# Patient Record
Sex: FEMALE | Race: OTHER | HISPANIC OR LATINO
[De-identification: names, ages, dates, MRNs, and addresses within clinical notes are randomized per-mention and may not be internally consistent; named-entity substitution may affect disease eponyms.]

---

## 2017-04-24 ENCOUNTER — APPOINTMENT (OUTPATIENT)
Dept: ELECTROPHYSIOLOGY | Facility: CLINIC | Age: 82
End: 2017-04-24

## 2017-08-17 ENCOUNTER — APPOINTMENT (OUTPATIENT)
Dept: ELECTROPHYSIOLOGY | Facility: CLINIC | Age: 82
End: 2017-08-17
Payer: MEDICARE

## 2017-08-17 PROCEDURE — 93280 PM DEVICE PROGR EVAL DUAL: CPT

## 2017-11-20 ENCOUNTER — APPOINTMENT (OUTPATIENT)
Dept: ELECTROPHYSIOLOGY | Facility: CLINIC | Age: 82
End: 2017-11-20
Payer: MEDICARE

## 2017-11-20 PROCEDURE — 93296 REM INTERROG EVL PM/IDS: CPT

## 2017-11-20 PROCEDURE — 93294 REM INTERROG EVL PM/LDLS PM: CPT

## 2018-03-08 ENCOUNTER — APPOINTMENT (OUTPATIENT)
Dept: ELECTROPHYSIOLOGY | Facility: CLINIC | Age: 83
End: 2018-03-08

## 2018-03-16 ENCOUNTER — APPOINTMENT (OUTPATIENT)
Dept: ELECTROPHYSIOLOGY | Facility: CLINIC | Age: 83
End: 2018-03-16
Payer: MEDICARE

## 2018-03-16 PROCEDURE — 93280 PM DEVICE PROGR EVAL DUAL: CPT

## 2018-06-12 ENCOUNTER — APPOINTMENT (OUTPATIENT)
Dept: ELECTROPHYSIOLOGY | Facility: CLINIC | Age: 83
End: 2018-06-12
Payer: MEDICARE

## 2018-06-12 PROCEDURE — 93294 REM INTERROG EVL PM/LDLS PM: CPT

## 2018-06-12 PROCEDURE — 93296 REM INTERROG EVL PM/IDS: CPT

## 2018-08-08 ENCOUNTER — APPOINTMENT (OUTPATIENT)
Dept: VASCULAR SURGERY | Facility: CLINIC | Age: 83
End: 2018-08-08
Payer: MEDICARE

## 2018-08-08 VITALS
TEMPERATURE: 98 F | BODY MASS INDEX: 33.58 KG/M2 | HEIGHT: 58 IN | DIASTOLIC BLOOD PRESSURE: 78 MMHG | WEIGHT: 160 LBS | SYSTOLIC BLOOD PRESSURE: 147 MMHG | HEART RATE: 83 BPM

## 2018-08-08 PROCEDURE — 99204 OFFICE O/P NEW MOD 45 MIN: CPT

## 2018-08-08 PROCEDURE — 93880 EXTRACRANIAL BILAT STUDY: CPT

## 2018-09-13 ENCOUNTER — APPOINTMENT (OUTPATIENT)
Dept: ELECTROPHYSIOLOGY | Facility: CLINIC | Age: 83
End: 2018-09-13
Payer: MEDICARE

## 2018-09-13 PROCEDURE — 93280 PM DEVICE PROGR EVAL DUAL: CPT

## 2018-09-13 RX ORDER — AMLODIPINE BESYLATE VALSARTAN HYDROCHLOROTHIAZIDE 5; 12.5; 16 MG/1; MG/1; MG/1
5-160-12.5 TABLET, FILM COATED ORAL
Refills: 0 | Status: DISCONTINUED | COMMUNITY
End: 2018-09-13

## 2018-09-13 RX ORDER — AMLODIPINE VALSARTAN AND HYDROCHLOROTHIAZIDE 5; 160; 12.5 MG/1; MG/1; MG/1
5-160-12.5 TABLET, FILM COATED ORAL DAILY
Refills: 0 | Status: DISCONTINUED | COMMUNITY
End: 2018-09-13

## 2018-12-17 ENCOUNTER — APPOINTMENT (OUTPATIENT)
Dept: ELECTROPHYSIOLOGY | Facility: CLINIC | Age: 83
End: 2018-12-17

## 2019-03-21 ENCOUNTER — APPOINTMENT (OUTPATIENT)
Dept: ELECTROPHYSIOLOGY | Facility: CLINIC | Age: 84
End: 2019-03-21
Payer: MEDICARE

## 2019-03-21 PROCEDURE — 93280 PM DEVICE PROGR EVAL DUAL: CPT

## 2019-03-21 RX ORDER — LOSARTAN POTASSIUM AND HYDROCHLOROTHIAZIDE 12.5; 1 MG/1; MG/1
100-12.5 TABLET ORAL
Refills: 0 | Status: DISCONTINUED | COMMUNITY
End: 2019-03-21

## 2019-06-17 ENCOUNTER — APPOINTMENT (OUTPATIENT)
Dept: PULMONOLOGY | Facility: CLINIC | Age: 84
End: 2019-06-17
Payer: MEDICARE

## 2019-06-17 VITALS
SYSTOLIC BLOOD PRESSURE: 142 MMHG | BODY MASS INDEX: 33.58 KG/M2 | HEART RATE: 64 BPM | HEIGHT: 58 IN | DIASTOLIC BLOOD PRESSURE: 75 MMHG | RESPIRATION RATE: 16 BRPM | OXYGEN SATURATION: 97 % | WEIGHT: 160 LBS | TEMPERATURE: 98 F

## 2019-06-17 PROCEDURE — 94727 GAS DIL/WSHOT DETER LNG VOL: CPT

## 2019-06-17 PROCEDURE — 99203 OFFICE O/P NEW LOW 30 MIN: CPT | Mod: 25

## 2019-06-17 PROCEDURE — 94060 EVALUATION OF WHEEZING: CPT

## 2019-06-17 PROCEDURE — 94729 DIFFUSING CAPACITY: CPT

## 2019-06-17 NOTE — PROCEDURE
[FreeTextEntry1] : PFT\par \par mild restriction\par \par mild moderate obstruction (has used Symbicort earlier today)\par \par diminished diffusion\par \par \par

## 2019-06-17 NOTE — REVIEW OF SYSTEMS
[Fever] : fever [Nasal Congestion] : nasal congestion [Hypertension] : ~T hypertension [Edema] : ~T edema was present [Heartburn] : heartburn [Reflux] : reflux [Recent Wt Loss (___ Lbs)] : no recent weight loss [Sinus Problems] : no sinus problems [Dysrhythmia] : no dysrhythmia [Hay Fever] : no hay fever [Vomiting] : no vomiting [FreeTextEntry9] : not currently [FreeTextEntry2] : no prior history of sinusiotis

## 2019-06-17 NOTE — PHYSICAL EXAM
[Elongated Uvula] : elongated uvula [Enlarged Base of the Tongue] : enlargement of the base of the tongue [Jugular Venous Distention Increased] : there was no jugular-venous distention [Heart Rate And Rhythm] : heart rate and rhythm were normal [Murmurs] : no murmurs present [Heart Sounds] : normal S1 and S2 [Arterial Pulses Normal] : the arterial pulses were normal [Respiration, Rhythm And Depth] : normal respiratory rhythm and effort [Exaggerated Use Of Accessory Muscles For Inspiration] : no accessory muscle use [Bowel Sounds] : normal bowel sounds [Kyphosis] : kyphosis [Abdomen Tenderness] : non-tender [] : no hepato-splenomegaly [Abdomen Soft] : soft [Nail Clubbing] : no clubbing of the fingernails [Motor Exam] : the motor exam was normal [Mood] : the mood was normal [Affect] : the affect was normal [Normal Oropharynx] : abnormal oropharynx [Low Lying Soft Palate] : no low lying soft palate [FreeTextEntry1] : coarse airway rhonchi and wheeze [FreeTextEntry2] : no edema

## 2019-06-17 NOTE — HISTORY OF PRESENT ILLNESS
[FreeTextEntry1] : 86 year old woman who presents due to chronic cough as well as dyspnea on exertion.  She states she has had it for 2-3 years and is worse recently. She is accompanied by her son and other .  She developed increased cough 2 weeks ago after a URI.  She states she did not require antibiotic. She states she still has yellow phlegm. She reports nasal congestion No chest pain.  She was started on Symbicort 1-2 weeks ago because of her symptoms.  \par \par She has been wheezing.  She has no prior history in the past.  She has had pneumonia in the past.\par \par \par PSH:\par \par CAB\par Gallbladder\par appendectomy\par Knees\par wrist\par \par ? hysterectomy\par \par PMH\par HTN\par \par montelukast\par \par Symbicort 80/4.5\par \par albuterol via nebulizer  3 times per day\par \par ASA\par \par Allergy\par \par NKDA\par no seasonal allergy.\par \par \par \par SH never smoked\par \par \par \par \par ROS back pain, leg fatigue\par arthritis symptoms

## 2019-06-17 NOTE — DISCUSSION/SUMMARY
[FreeTextEntry1] : asthmatic bronchitis\par \par reactive airways\par \par restriction due to body habitus or parenchyma lung condition\par \par diminished DLCO\par \par history of CAD\par \par PLAN\par \par CXR\par \par continue Symbicort 2 p BID, albuterol as needed, decrease nebulzier use\par \par prednisone taper 20 mg\par \par oral antibiotic to be started, doxycycline 100 mg once daily for 7 days\par \par expectorant Mucinex daily\par \par consider workup for LEONARD after current episode.\par \par [facility]

## 2019-07-01 ENCOUNTER — APPOINTMENT (OUTPATIENT)
Dept: PULMONOLOGY | Facility: CLINIC | Age: 84
End: 2019-07-01
Payer: MEDICARE

## 2019-07-01 VITALS
WEIGHT: 160 LBS | OXYGEN SATURATION: 95 % | HEART RATE: 64 BPM | SYSTOLIC BLOOD PRESSURE: 153 MMHG | TEMPERATURE: 98.5 F | DIASTOLIC BLOOD PRESSURE: 72 MMHG | RESPIRATION RATE: 16 BRPM | BODY MASS INDEX: 33.58 KG/M2 | HEIGHT: 58 IN

## 2019-07-01 DIAGNOSIS — I25.10 ATHEROSCLEROTIC HEART DISEASE OF NATIVE CORONARY ARTERY W/OUT ANGINA PECTORIS: ICD-10-CM

## 2019-07-01 PROCEDURE — 99214 OFFICE O/P EST MOD 30 MIN: CPT

## 2019-07-01 RX ORDER — ALBUTEROL SULFATE 90 UG/1
108 (90 BASE) AEROSOL, METERED RESPIRATORY (INHALATION)
Qty: 1 | Refills: 5 | Status: ACTIVE | COMMUNITY
Start: 2019-07-01 | End: 1900-01-01

## 2019-07-01 NOTE — DISCUSSION/SUMMARY
[FreeTextEntry1] : Bronchitis, improved\par \par CAD, stable\par \par PLAN\par \par She may use albuterol as needed\par \par Will monitor off Symbicort as she has no prior history and has done well off Symbicort\par \par I have ordered SERA to use as needed. \par \par CXR was not done.  Will reorder\par \par [facility]

## 2019-07-01 NOTE — PHYSICAL EXAM
[Elongated Uvula] : elongated uvula [Enlarged Base of the Tongue] : enlargement of the base of the tongue [Jugular Venous Distention Increased] : there was no jugular-venous distention [Heart Rate And Rhythm] : heart rate and rhythm were normal [Heart Sounds] : normal S1 and S2 [Murmurs] : no murmurs present [Arterial Pulses Normal] : the arterial pulses were normal [Respiration, Rhythm And Depth] : normal respiratory rhythm and effort [Exaggerated Use Of Accessory Muscles For Inspiration] : no accessory muscle use [Auscultation Breath Sounds / Voice Sounds] : lungs were clear to auscultation bilaterally [Kyphosis] : kyphosis [Bowel Sounds] : normal bowel sounds [Abdomen Soft] : soft [Abdomen Tenderness] : non-tender [] : no hepato-splenomegaly [Nail Clubbing] : no clubbing of the fingernails [Motor Exam] : the motor exam was normal [Affect] : the affect was normal [Mood] : the mood was normal [Normal Oropharynx] : abnormal oropharynx [Low Lying Soft Palate] : no low lying soft palate [FreeTextEntry1] : protuberant [FreeTextEntry2] : no edema

## 2019-07-01 NOTE — REVIEW OF SYSTEMS
[Fever] : fever [Nasal Congestion] : nasal congestion [Hypertension] : ~T hypertension [Edema] : ~T edema was present [Heartburn] : heartburn [Reflux] : reflux [Recent Wt Loss (___ Lbs)] : no recent weight loss [Sinus Problems] : no sinus problems [Dysrhythmia] : no dysrhythmia [Hay Fever] : no hay fever [Vomiting] : no vomiting [FreeTextEntry2] : no prior history of sinusitis [FreeTextEntry9] : not currently

## 2019-07-01 NOTE — HISTORY OF PRESENT ILLNESS
[FreeTextEntry1] : 86 year old woman who presented due to chronic cough as well as dyspnea on exertion.  She has a history of CAD and has had CABG. She has not been a smoker and does not have a history of asthma.  She has had daytime somnolence, but no snoring.  On prior visit, she was prescribed a prednisone course and continued on Symbicort. She returns today, much improved, with  only mild cough.  She is accompanied by her son. \par \par She still has stable dyspnea on exertion.  \par She feels she is back to normal.\par \par She is using her Symbicort  inhaler only once per day. \par \par \par PSH:\par \par CAB\par Gallbladder\par appendectomy\par Knees\par wrist\par \par ? hysterectomy\par \par PMH\par HTN\par \par montelukast\par \par Symbicort 80/4.5\par \par albuterol via nebulizer  3 times per day\par \par ASA\par \par Allergy\par \par NKDA\par no seasonal allergy.\par \par \par \par SH never smoked\par \par \par \par \par ROS back pain, leg fatigue\par arthritis symptoms

## 2019-07-05 ENCOUNTER — APPOINTMENT (OUTPATIENT)
Dept: ELECTROPHYSIOLOGY | Facility: CLINIC | Age: 84
End: 2019-07-05
Payer: MEDICARE

## 2019-07-05 PROCEDURE — 93294 REM INTERROG EVL PM/LDLS PM: CPT

## 2019-07-05 PROCEDURE — 93296 REM INTERROG EVL PM/IDS: CPT

## 2019-07-08 ENCOUNTER — APPOINTMENT (OUTPATIENT)
Dept: ELECTROPHYSIOLOGY | Facility: CLINIC | Age: 84
End: 2019-07-08
Payer: MEDICARE

## 2019-07-08 VITALS
RESPIRATION RATE: 16 BRPM | HEART RATE: 65 BPM | DIASTOLIC BLOOD PRESSURE: 70 MMHG | OXYGEN SATURATION: 95 % | BODY MASS INDEX: 33.58 KG/M2 | WEIGHT: 160 LBS | HEIGHT: 58 IN | SYSTOLIC BLOOD PRESSURE: 147 MMHG

## 2019-07-08 DIAGNOSIS — R07.9 CHEST PAIN, UNSPECIFIED: ICD-10-CM

## 2019-07-08 DIAGNOSIS — Z95.0 PRESENCE OF CARDIAC PACEMAKER: ICD-10-CM

## 2019-07-08 DIAGNOSIS — Z45.010 ENCOUNTER FOR CHECKING AND TESTING OF CARDIAC PACEMAKER PULSE GENERATOR [BATTERY]: ICD-10-CM

## 2019-07-08 PROCEDURE — 99205 OFFICE O/P NEW HI 60 MIN: CPT

## 2019-07-08 PROCEDURE — 93000 ELECTROCARDIOGRAM COMPLETE: CPT | Mod: 59

## 2019-07-08 PROCEDURE — 93280 PM DEVICE PROGR EVAL DUAL: CPT

## 2019-07-08 RX ORDER — DOXYCYCLINE HYCLATE 100 MG/1
100 CAPSULE ORAL DAILY
Qty: 7 | Refills: 0 | Status: DISCONTINUED | COMMUNITY
Start: 2019-06-17 | End: 2019-07-08

## 2019-07-08 RX ORDER — AMLODIPINE BESYLATE 5 MG/1
5 TABLET ORAL
Refills: 0 | Status: DISCONTINUED | COMMUNITY
End: 2019-07-08

## 2019-07-08 RX ORDER — GUAIFENESIN 600 MG/1
600 TABLET, EXTENDED RELEASE ORAL TWICE DAILY
Qty: 60 | Refills: 2 | Status: DISCONTINUED | COMMUNITY
Start: 2019-06-17 | End: 2019-07-08

## 2019-07-08 NOTE — HISTORY OF PRESENT ILLNESS
[FreeTextEntry1] : Walt Good MD\par \par Khushbu Reeder is an 85y/o woman with Hx of HTN, HLD, both of which are stable, COPD/asthma, CAD s/p CABG (2015), and sinus node dysfunction s/p dual chamber PPM placement who presents today for initial evaluation as device has reached ALEXYS. Admits feeling increased dyspnea on exertion as well as chest discomfort. Dyspnea can persist with minimal activity. Does follow with Pulmonary but admits noncompliance with medication/treatment (steroids/inhaler/Singulair). Limits her activity/walking secondary to dyspnea. Also notes chest discomfort which occurs with exertion and is relieved with rest. Recalls feeling similar symptoms prior to CABG. Unsure of recent cardiac work up/ischemic work up. Denies chest pain at present, palpitations, SOB at rest, syncope or near syncope.\par

## 2019-07-08 NOTE — DISCUSSION/SUMMARY
[FreeTextEntry1] : Khushbu Reeder is an 87y/o woman with Hx of HTN, HLD, both of which are stable, COPD/asthma, CAD s/p CABG (2015), and sinus node dysfunction s/p dual chamber PPM placement who presents today for initial evaluation as device has reached ALXEYS. \par \par Impression:\par \par 1. Sick sinus syndrome: s/p dual chamber PPM placement. EKG today shows ventricular pacing. Device check performed today revealed ALEXYS has been reached. Device in otherwise working condition with leads intact. Given Vpacing 100% of the time and class II/III symptoms, recommend ECHO to assess LVEF. If LVEF < 50%, would consider upgrade to BiV PPM to allow for CRT. \par \par 2. Chest discomfort/dyspnea: given known CAD, recommend undergoing nuclear stress test if has not performed recently. Will reach out to Cardiologist to confirm recent testing. \par \par 3. HTN: resume oral antihypertensives as prescribed. Encouraged heart healthy diet, sodium restriction, and weight loss. Continue regular f/u with Cardiologist for further HTN management.\par \par 4. HLD: resume statin therapy as prescribed and regular f/u with Cardiologist for routine lipid monitoring and management.\par \par Will plan for nuclear stress test/ECHO followed by either pacemaker gen change or upgrade if indicated.

## 2019-07-08 NOTE — PHYSICAL EXAM
[General Appearance - Well Developed] : well developed [Normal Appearance] : normal appearance [Well Groomed] : well groomed [General Appearance - Well Nourished] : well nourished [No Deformities] : no deformities [General Appearance - In No Acute Distress] : no acute distress [Normal Conjunctiva] : the conjunctiva exhibited no abnormalities [Eyelids - No Xanthelasma] : the eyelids demonstrated no xanthelasmas [Normal Oral Mucosa] : normal oral mucosa [No Oral Pallor] : no oral pallor [No Oral Cyanosis] : no oral cyanosis [Normal Jugular Venous A Waves Present] : normal jugular venous A waves present [Normal Jugular Venous V Waves Present] : normal jugular venous V waves present [No Jugular Venous Walker A Waves] : no jugular venous walker A waves [Heart Rate And Rhythm] : heart rate and rhythm were normal [Heart Sounds] : normal S1 and S2 [Murmurs] : no murmurs present [Respiration, Rhythm And Depth] : normal respiratory rhythm and effort [Exaggerated Use Of Accessory Muscles For Inspiration] : no accessory muscle use [Auscultation Breath Sounds / Voice Sounds] : lungs were clear to auscultation bilaterally [Abdomen Soft] : soft [Abdomen Tenderness] : non-tender [Abdomen Mass (___ Cm)] : no abdominal mass palpated [Abnormal Walk] : normal gait [Gait - Sufficient For Exercise Testing] : the gait was sufficient for exercise testing [Nail Clubbing] : no clubbing of the fingernails [Cyanosis, Localized] : no localized cyanosis [Petechial Hemorrhages (___cm)] : no petechial hemorrhages [Skin Color & Pigmentation] : normal skin color and pigmentation [] : no rash [No Venous Stasis] : no venous stasis [Skin Lesions] : no skin lesions [No Skin Ulcers] : no skin ulcer [No Xanthoma] : no  xanthoma was observed [Oriented To Time, Place, And Person] : oriented to person, place, and time [Affect] : the affect was normal [Mood] : the mood was normal [No Anxiety] : not feeling anxious

## 2019-07-09 LAB
ALBUMIN SERPL ELPH-MCNC: 4.3 G/DL
ALP BLD-CCNC: 53 U/L
ALT SERPL-CCNC: 18 U/L
ANION GAP SERPL CALC-SCNC: 16 MMOL/L
AST SERPL-CCNC: 17 U/L
BASOPHILS # BLD AUTO: 0.06 K/UL
BASOPHILS NFR BLD AUTO: 0.8 %
BILIRUB SERPL-MCNC: 1 MG/DL
BUN SERPL-MCNC: 43 MG/DL
CALCIUM SERPL-MCNC: 10.5 MG/DL
CHLORIDE SERPL-SCNC: 103 MMOL/L
CO2 SERPL-SCNC: 22 MMOL/L
CREAT SERPL-MCNC: 1.52 MG/DL
EOSINOPHIL # BLD AUTO: 0.44 K/UL
EOSINOPHIL NFR BLD AUTO: 6 %
HCT VFR BLD CALC: 44.1 %
HGB BLD-MCNC: 13.3 G/DL
IMM GRANULOCYTES NFR BLD AUTO: 0.3 %
LYMPHOCYTES # BLD AUTO: 1.29 K/UL
LYMPHOCYTES NFR BLD AUTO: 17.5 %
MAN DIFF?: NORMAL
MCHC RBC-ENTMCNC: 28.7 PG
MCHC RBC-ENTMCNC: 30.2 GM/DL
MCV RBC AUTO: 95 FL
MONOCYTES # BLD AUTO: 0.73 K/UL
MONOCYTES NFR BLD AUTO: 9.9 %
NEUTROPHILS # BLD AUTO: 4.83 K/UL
NEUTROPHILS NFR BLD AUTO: 65.5 %
PLATELET # BLD AUTO: 145 K/UL
POTASSIUM SERPL-SCNC: 4.8 MMOL/L
PROT SERPL-MCNC: 7.9 G/DL
RBC # BLD: 4.64 M/UL
RBC # FLD: 14.7 %
SODIUM SERPL-SCNC: 141 MMOL/L
WBC # FLD AUTO: 7.37 K/UL

## 2019-07-22 ENCOUNTER — MESSAGE (OUTPATIENT)
Age: 84
End: 2019-07-22

## 2019-07-23 ENCOUNTER — INPATIENT (INPATIENT)
Facility: HOSPITAL | Age: 84
LOS: 0 days | Discharge: HOME HEALTH | End: 2019-07-24
Attending: STUDENT IN AN ORGANIZED HEALTH CARE EDUCATION/TRAINING PROGRAM | Admitting: STUDENT IN AN ORGANIZED HEALTH CARE EDUCATION/TRAINING PROGRAM
Payer: MEDICARE

## 2019-07-23 VITALS
RESPIRATION RATE: 18 BRPM | WEIGHT: 165.35 LBS | HEIGHT: 63 IN | SYSTOLIC BLOOD PRESSURE: 132 MMHG | TEMPERATURE: 98 F | OXYGEN SATURATION: 98 % | HEART RATE: 61 BPM | DIASTOLIC BLOOD PRESSURE: 50 MMHG

## 2019-07-23 DIAGNOSIS — D25.9 LEIOMYOMA OF UTERUS, UNSPECIFIED: Chronic | ICD-10-CM

## 2019-07-23 DIAGNOSIS — Z95.1 PRESENCE OF AORTOCORONARY BYPASS GRAFT: Chronic | ICD-10-CM

## 2019-07-23 DIAGNOSIS — Z95.0 PRESENCE OF CARDIAC PACEMAKER: Chronic | ICD-10-CM

## 2019-07-23 DIAGNOSIS — I49.5 SICK SINUS SYNDROME: ICD-10-CM

## 2019-07-23 DIAGNOSIS — Z98.89 OTHER SPECIFIED POSTPROCEDURAL STATES: Chronic | ICD-10-CM

## 2019-07-23 DIAGNOSIS — N81.10 CYSTOCELE, UNSPECIFIED: Chronic | ICD-10-CM

## 2019-07-23 DIAGNOSIS — Z98.49 CATARACT EXTRACTION STATUS, UNSPECIFIED EYE: Chronic | ICD-10-CM

## 2019-07-23 PROCEDURE — 93306 TTE W/DOPPLER COMPLETE: CPT | Mod: 26

## 2019-07-23 PROCEDURE — 33228 REMV&REPLC PM GEN DUAL LEAD: CPT

## 2019-07-23 PROCEDURE — 93010 ELECTROCARDIOGRAM REPORT: CPT

## 2019-07-23 RX ORDER — HYDROCHLOROTHIAZIDE 25 MG
12.5 TABLET ORAL ONCE
Refills: 0 | Status: COMPLETED | OUTPATIENT
Start: 2019-07-23 | End: 2019-07-24

## 2019-07-23 RX ORDER — SODIUM CHLORIDE 9 MG/ML
3 INJECTION INTRAMUSCULAR; INTRAVENOUS; SUBCUTANEOUS EVERY 8 HOURS
Refills: 0 | Status: DISCONTINUED | OUTPATIENT
Start: 2019-07-23 | End: 2019-07-24

## 2019-07-23 RX ORDER — CEFAZOLIN SODIUM 1 G
1000 VIAL (EA) INJECTION EVERY 8 HOURS
Refills: 0 | Status: COMPLETED | OUTPATIENT
Start: 2019-07-24 | End: 2019-07-24

## 2019-07-23 RX ORDER — ALBUTEROL 90 UG/1
2 AEROSOL, METERED ORAL
Qty: 0 | Refills: 0 | DISCHARGE

## 2019-07-23 RX ORDER — LOSARTAN POTASSIUM 100 MG/1
100 TABLET, FILM COATED ORAL DAILY
Refills: 0 | Status: DISCONTINUED | OUTPATIENT
Start: 2019-07-23 | End: 2019-07-24

## 2019-07-23 RX ORDER — ASPIRIN/CALCIUM CARB/MAGNESIUM 324 MG
81 TABLET ORAL DAILY
Refills: 0 | Status: DISCONTINUED | OUTPATIENT
Start: 2019-07-23 | End: 2019-07-24

## 2019-07-23 RX ORDER — ALBUTEROL 90 UG/1
2 AEROSOL, METERED ORAL EVERY 6 HOURS
Refills: 0 | Status: DISCONTINUED | OUTPATIENT
Start: 2019-07-23 | End: 2019-07-24

## 2019-07-23 RX ORDER — OLMESARTAN MEDOXOMIL-HYDROCHLOROTHIAZIDE 25; 40 MG/1; MG/1
1 TABLET, FILM COATED ORAL
Qty: 0 | Refills: 0 | DISCHARGE

## 2019-07-23 RX ADMIN — SODIUM CHLORIDE 3 MILLILITER(S): 9 INJECTION INTRAMUSCULAR; INTRAVENOUS; SUBCUTANEOUS at 21:37

## 2019-07-23 NOTE — H&P CARDIOLOGY - SOURCE
- reliable and medical records/Patient Patient/- fair historian via [place]  [id number] who also agrees for patients son to translate and medical records

## 2019-07-23 NOTE — H&P CARDIOLOGY - FAMILY HISTORY
No pertinent family history in first degree relatives     Father  Still living? Unknown  Family history of diabetes mellitus, Age at diagnosis: Age Unknown     Mother  Still living? Unknown  Family history of diabetes mellitus, Age at diagnosis: Age Unknown

## 2019-07-23 NOTE — H&P CARDIOLOGY - HISTORY OF PRESENT ILLNESS
86 year old female with HTN, hyperlipidemia, known CAD with 2V CABG 2015, arthritis, PPM at ALEXYS with TRAVIS and chest pressure, patient with non-compliance with medications who presents for PPM generator change for ALEXYS versus upgrade to BIV PPM pending echo results. 86 year old female with HTN, hyperlipidemia, known CAD with 2V CABG 2015, arthritis, PPM at ALEXYS who presents for PPM generator change for ALEXYS versus upgrade to BIV PPM pending echo results.   Patient with history of TRAVIS and chest pressure, vaguely admits to still having these symptoms and states she only has right sided back pain.   Chart reviewed and discussed with Dr. KARELY Benjamin and patient to follow up with primary cardiologist for need for possible nuclear stress test  *OF NOTE: in record long medication list seen,  patient denies taking most of these medications

## 2019-07-23 NOTE — CHART NOTE - NSCHARTNOTEFT_GEN_A_CORE
Type of Procedure: PPM gen change  Licensed independent practitioner: Salena Benjamin MD  Assistant: None  Description of procedure:   After informed consent was obtained, the patient was brought to the electrophysiology laboratory in the fasting state. The pacemaker was interrogated. After confirmation of the replacement criterion and a ventricular escape rhythm of 45 bpm the patient was prepped and draped in the usual sterile fashion. Cefazolin 2g IV was administered prophylactically over one hour prior to the procedure.    Local anesthetic was delivered to the left pectoral region, and an incision was made over the existing generator and extended deeply, until the capsule was identified.  Using care to avoid the header of the generator and leads, the capsule was entered, and the device was freed and delivered from the pocket.  Atrial and ventricular leads were disconnected from the device.  Visual inspection of the leads did not show any abnormalities.  Impedance, sensing and pacing thresholds for both leads were adequate.  The existing subcutaneous pocket was modified using blunt dissection and cautery, and was copiously irrigated with a saline solution containing gentamicin and vancomycin.  After cleaning, both leads were connected to a new Medtronic generator and the entire system was implanted into the pocket.    The subcutaneous tissues were then closed with a layer of interrupted 2-0 vicryl suture and a running 2-0 vicryl stitch.  The skin was closed with dermabond. The wound was covered with a dry sterile dressing.  The patient left the lab alert and in good condition.   Conscious sedation was monitored by a member of the EP nursing staff.    Findings of procedure: None  Estimated blood loss: <10cc  Specimen removed: N/A  Preoperative Dx: Sinus node  Postoperative Dx: Sinus node  Complications: None  Anesthesia type: Conscious Type of Procedure: PPM gen change  Licensed independent practitioner: Salena Benjamin MD  Assistant: None  Description of procedure:   After informed consent was obtained, the patient was brought to the electrophysiology laboratory in the fasting state. The pacemaker was interrogated. After confirmation of the replacement criterion and a ventricular escape rhythm of 45 bpm the patient was prepped and draped in the usual sterile fashion. Cefazolin 2g IV was administered prophylactically over one hour prior to the procedure.    Local anesthetic was delivered to the left pectoral region, and an incision was made over the existing generator and extended deeply, until the capsule was identified.  Using care to avoid the header of the generator and leads, the capsule was entered, and the device was freed and delivered from the pocket.  Atrial and ventricular leads were disconnected from the device.  Visual inspection of the leads did not show any abnormalities.  Impedance, sensing and pacing thresholds for both leads were adequate.  The existing subcutaneous pocket was modified using blunt dissection and cautery, and was copiously irrigated with a saline solution containing gentamicin and vancomycin.  After cleaning, both leads were connected to a new Medtronic generator and the entire system was implanted into the pocket.    The subcutaneous tissues were then closed with a layer of interrupted 2-0 vicryl suture and a running 2-0 vicryl stitch.  The skin was closed with dermabond. The wound was covered with a dry sterile dressing.  The patient left the lab alert and in good condition.   Conscious sedation was monitored by a member of the EP nursing staff. The patient was sent back to [facility] and to be discharged home in 1 hour.    Findings of procedure: None  Estimated blood loss: <10cc  Specimen removed: N/A  Preoperative Dx: Sinus node  Postoperative Dx: Sinus node  Complications: None  Anesthesia type: Conscious

## 2019-07-23 NOTE — H&P CARDIOLOGY - PMH
Arthritis    CAD (coronary artery disease)    Dehydration    HLD (hyperlipidemia)    HLD (Hyperlipidemia)    HTN (hypertension)    HTN (Hypertension)

## 2019-07-24 ENCOUNTER — TRANSCRIPTION ENCOUNTER (OUTPATIENT)
Age: 84
End: 2019-07-24

## 2019-07-24 VITALS
OXYGEN SATURATION: 99 % | RESPIRATION RATE: 18 BRPM | HEART RATE: 61 BPM | SYSTOLIC BLOOD PRESSURE: 176 MMHG | DIASTOLIC BLOOD PRESSURE: 73 MMHG | TEMPERATURE: 98 F

## 2019-07-24 LAB
ANION GAP SERPL CALC-SCNC: 12 MMO/L — SIGNIFICANT CHANGE UP (ref 7–14)
BASOPHILS # BLD AUTO: 0.04 K/UL — SIGNIFICANT CHANGE UP (ref 0–0.2)
BASOPHILS NFR BLD AUTO: 0.7 % — SIGNIFICANT CHANGE UP (ref 0–2)
BUN SERPL-MCNC: 30 MG/DL — HIGH (ref 7–23)
CALCIUM SERPL-MCNC: 9.2 MG/DL — SIGNIFICANT CHANGE UP (ref 8.4–10.5)
CHLORIDE SERPL-SCNC: 107 MMOL/L — SIGNIFICANT CHANGE UP (ref 98–107)
CO2 SERPL-SCNC: 22 MMOL/L — SIGNIFICANT CHANGE UP (ref 22–31)
CREAT SERPL-MCNC: 1.34 MG/DL — HIGH (ref 0.5–1.3)
EOSINOPHIL # BLD AUTO: 0.31 K/UL — SIGNIFICANT CHANGE UP (ref 0–0.5)
EOSINOPHIL NFR BLD AUTO: 5.1 % — SIGNIFICANT CHANGE UP (ref 0–6)
GLUCOSE SERPL-MCNC: 85 MG/DL — SIGNIFICANT CHANGE UP (ref 70–99)
HCT VFR BLD CALC: 36.9 % — SIGNIFICANT CHANGE UP (ref 34.5–45)
HGB BLD-MCNC: 11.4 G/DL — LOW (ref 11.5–15.5)
IMM GRANULOCYTES NFR BLD AUTO: 0.3 % — SIGNIFICANT CHANGE UP (ref 0–1.5)
LYMPHOCYTES # BLD AUTO: 0.89 K/UL — LOW (ref 1–3.3)
LYMPHOCYTES # BLD AUTO: 14.7 % — SIGNIFICANT CHANGE UP (ref 13–44)
MAGNESIUM SERPL-MCNC: 2 MG/DL — SIGNIFICANT CHANGE UP (ref 1.6–2.6)
MCHC RBC-ENTMCNC: 28.1 PG — SIGNIFICANT CHANGE UP (ref 27–34)
MCHC RBC-ENTMCNC: 30.9 % — LOW (ref 32–36)
MCV RBC AUTO: 90.9 FL — SIGNIFICANT CHANGE UP (ref 80–100)
MONOCYTES # BLD AUTO: 0.54 K/UL — SIGNIFICANT CHANGE UP (ref 0–0.9)
MONOCYTES NFR BLD AUTO: 8.9 % — SIGNIFICANT CHANGE UP (ref 2–14)
NEUTROPHILS # BLD AUTO: 4.26 K/UL — SIGNIFICANT CHANGE UP (ref 1.8–7.4)
NEUTROPHILS NFR BLD AUTO: 70.3 % — SIGNIFICANT CHANGE UP (ref 43–77)
NRBC # FLD: 0 K/UL — SIGNIFICANT CHANGE UP (ref 0–0)
PHOSPHATE SERPL-MCNC: 3.7 MG/DL — SIGNIFICANT CHANGE UP (ref 2.5–4.5)
PLATELET # BLD AUTO: 126 K/UL — LOW (ref 150–400)
PMV BLD: 12.2 FL — SIGNIFICANT CHANGE UP (ref 7–13)
POTASSIUM SERPL-MCNC: 4.4 MMOL/L — SIGNIFICANT CHANGE UP (ref 3.5–5.3)
POTASSIUM SERPL-SCNC: 4.4 MMOL/L — SIGNIFICANT CHANGE UP (ref 3.5–5.3)
RBC # BLD: 4.06 M/UL — SIGNIFICANT CHANGE UP (ref 3.8–5.2)
RBC # FLD: 14.1 % — SIGNIFICANT CHANGE UP (ref 10.3–14.5)
SODIUM SERPL-SCNC: 141 MMOL/L — SIGNIFICANT CHANGE UP (ref 135–145)
WBC # BLD: 6.06 K/UL — SIGNIFICANT CHANGE UP (ref 3.8–10.5)
WBC # FLD AUTO: 6.06 K/UL — SIGNIFICANT CHANGE UP (ref 3.8–10.5)

## 2019-07-24 PROCEDURE — 99232 SBSQ HOSP IP/OBS MODERATE 35: CPT

## 2019-07-24 RX ORDER — AMLODIPINE BESYLATE 2.5 MG/1
1 TABLET ORAL
Qty: 30 | Refills: 0
Start: 2019-07-24 | End: 2019-08-22

## 2019-07-24 RX ORDER — AMLODIPINE BESYLATE 2.5 MG/1
5 TABLET ORAL ONCE
Refills: 0 | Status: COMPLETED | OUTPATIENT
Start: 2019-07-24 | End: 2019-07-24

## 2019-07-24 RX ADMIN — AMLODIPINE BESYLATE 5 MILLIGRAM(S): 2.5 TABLET ORAL at 13:46

## 2019-07-24 RX ADMIN — SODIUM CHLORIDE 3 MILLILITER(S): 9 INJECTION INTRAMUSCULAR; INTRAVENOUS; SUBCUTANEOUS at 05:56

## 2019-07-24 RX ADMIN — Medication 100 MILLIGRAM(S): at 02:40

## 2019-07-24 RX ADMIN — Medication 100 MILLIGRAM(S): at 13:46

## 2019-07-24 RX ADMIN — Medication 81 MILLIGRAM(S): at 13:46

## 2019-07-24 RX ADMIN — LOSARTAN POTASSIUM 100 MILLIGRAM(S): 100 TABLET, FILM COATED ORAL at 06:01

## 2019-07-24 RX ADMIN — Medication 12.5 MILLIGRAM(S): at 06:00

## 2019-07-24 NOTE — DISCHARGE NOTE PROVIDER - HOSPITAL COURSE
86 year old female with HTN, hyperlipidemia, known CAD with 2V CABG 2015, arthritis, PPM at ALEXYS who presents for PPM generator change for ALEXYS versus upgrade to BIV PPM pending echo results.     Patient with history of TRAVIS and chest pressure, vaguely admits to still having these symptoms and states she only has right sided back pain.     Chart reviewed and discussed with Dr. KARELY Benjamin and patient to follow up with primary cardiologist for need for possible nuclear stress test    *OF NOTE: in record long medication list seen,  patient denies taking most of these medications         Now s/p dual chamber PPM with Dr. Benjamin on 7/23/2019.    Feels generally well.  Left sided device site is healing well; without ecchymosis, drainage, hematoma, erythema, mild swelling.      Teaching provided regarding left arm restrictions, site care, and showering.  The temporary ID card, PPM booklet, and wound check letter were left in the care of the patient.  Interrogation of by device rep demonstrated normal function.  Outpatient follow up with Dr. Benjamin is scheduled for 08-Aug-2019 12:00.    No need for CXR.        Discussed case with EP, pt cleared for discharge for home.

## 2019-07-24 NOTE — CHART NOTE - NSCHARTNOTEFT_GEN_A_CORE
PPM site stable. No active bleeding or hematoma. Dressing is clean, dry and intact. Will continue to monitor.

## 2019-07-24 NOTE — CHART NOTE - NSCHARTNOTEFT_GEN_A_CORE
BP elevated this afternoon, discussed with EP, will give Norvasc x1 now and discharge home with 30 day supply. Pt can f/u as outpatient for further BP management.     Vital Signs Last 24 Hrs  T(C): 36.8 (24 Jul 2019 12:08), Max: 36.8 (23 Jul 2019 20:34)  T(F): 98.3 (24 Jul 2019 12:08), Max: 98.3 (23 Jul 2019 20:34)  HR: 61 (24 Jul 2019 12:08) (61 - 61)  BP: 176/73 (24 Jul 2019 12:08) (132/50 - 180/62)  BP(mean): --  RR: 18 (24 Jul 2019 12:08) (18 - 18)  SpO2: 99% (24 Jul 2019 12:08) (98% - 99%)

## 2019-07-24 NOTE — DISCHARGE NOTE PROVIDER - NSDCCPCAREPLAN_GEN_ALL_CORE_FT
PRINCIPAL DISCHARGE DIAGNOSIS  Diagnosis: Sick sinus syndrome  Assessment and Plan of Treatment: Pacemaker generator change was performed in the hospital, No heavy lifting greater than 5-10 pounds with left upper extremity x two weeks. No strenuous activity x 3 weeks. Monitor site of procedure and notify your doctor for any redness, swelling, discharge.      SECONDARY DISCHARGE DIAGNOSES  Diagnosis: HTN (hypertension)  Assessment and Plan of Treatment: Low sodium and fat diet, continue anti-hypertensive medications, and follow up with primary care physician. Follow up with your primary care physician for further monitoring in 1-2 weeks. Please call to arrange appointment.

## 2019-07-24 NOTE — DISCHARGE NOTE NURSING/CASE MANAGEMENT/SOCIAL WORK - NSDCDPATPORTLINK_GEN_ALL_CORE
You can access the [facility] Patient Portal, offered by [facility], by registering with the following website: [email or web address]

## 2019-07-24 NOTE — PROGRESS NOTE ADULT - SUBJECTIVE AND OBJECTIVE BOX
Patient seen and evaluated.  No significant events overnight.  Now s/p dual chamber PPM with Dr. Benjamin on 7/23/2019.    Feels generally well.  Left sided device site is healing well; without ecchymosis, drainage, hematoma, erythema, mild swelling.      Teaching provided regarding left arm restrictions, site care, and showering.  The temporary ID card, PPM booklet, and wound check letter were left in the care of the patient.  Interrogation of by device rep demonstrated normal function.  Outpatient follow up with Dr. Benjamin is scheduled for 08-Aug-2019 12:00.    No need for CXR. Patient seen and evaluated.  No significant events overnight.  Now s/p dual chamber PPM gen change with Dr. Benjamin on 7/23/2019.    Feels generally well.  Left sided device site is healing well; without ecchymosis, drainage, hematoma, erythema, mild swelling.      Teaching provided regarding left arm restrictions, site care, and showering.  The temporary ID card, PPM booklet, and wound check letter were left in the care of the patient.  Interrogation of by device rep demonstrated normal function.  Outpatient follow up with Dr. Benjamin is scheduled for 08-Aug-2019 12:00.    No need for CXR.

## 2019-08-05 ENCOUNTER — NON-APPOINTMENT (OUTPATIENT)
Age: 84
End: 2019-08-05

## 2019-08-08 ENCOUNTER — APPOINTMENT (OUTPATIENT)
Dept: ELECTROPHYSIOLOGY | Facility: CLINIC | Age: 84
End: 2019-08-08
Payer: MEDICARE

## 2019-08-08 DIAGNOSIS — Z86.79 PERSONAL HISTORY OF OTHER DISEASES OF THE CIRCULATORY SYSTEM: ICD-10-CM

## 2019-08-08 PROCEDURE — 99024 POSTOP FOLLOW-UP VISIT: CPT

## 2019-08-14 ENCOUNTER — APPOINTMENT (OUTPATIENT)
Dept: VASCULAR SURGERY | Facility: CLINIC | Age: 84
End: 2019-08-14

## 2019-10-07 ENCOUNTER — APPOINTMENT (OUTPATIENT)
Dept: PULMONOLOGY | Facility: CLINIC | Age: 84
End: 2019-10-07
Payer: MEDICARE

## 2019-10-07 ENCOUNTER — APPOINTMENT (OUTPATIENT)
Dept: ELECTROPHYSIOLOGY | Facility: CLINIC | Age: 84
End: 2019-10-07

## 2019-10-07 VITALS
SYSTOLIC BLOOD PRESSURE: 153 MMHG | HEART RATE: 87 BPM | DIASTOLIC BLOOD PRESSURE: 74 MMHG | WEIGHT: 160 LBS | BODY MASS INDEX: 31.41 KG/M2 | TEMPERATURE: 97.6 F | RESPIRATION RATE: 12 BRPM | OXYGEN SATURATION: 95 % | HEIGHT: 60 IN

## 2019-10-07 DIAGNOSIS — R06.09 OTHER FORMS OF DYSPNEA: ICD-10-CM

## 2019-10-07 DIAGNOSIS — J45.909 UNSPECIFIED ASTHMA, UNCOMPLICATED: ICD-10-CM

## 2019-10-07 PROCEDURE — 99214 OFFICE O/P EST MOD 30 MIN: CPT

## 2019-10-07 RX ORDER — BUDESONIDE AND FORMOTEROL FUMARATE DIHYDRATE 80; 4.5 UG/1; UG/1
80-4.5 AEROSOL RESPIRATORY (INHALATION) TWICE DAILY
Qty: 1 | Refills: 5 | Status: DISCONTINUED | COMMUNITY
Start: 2019-06-17 | End: 2019-10-07

## 2019-10-07 RX ORDER — MONTELUKAST 10 MG/1
10 TABLET, FILM COATED ORAL DAILY
Refills: 0 | Status: DISCONTINUED | COMMUNITY
End: 2019-10-07

## 2019-10-07 RX ORDER — PREDNISONE 20 MG/1
20 TABLET ORAL DAILY
Qty: 5 | Refills: 0 | Status: DISCONTINUED | COMMUNITY
Start: 2019-06-17 | End: 2019-10-07

## 2019-10-07 NOTE — DISCUSSION/SUMMARY
[FreeTextEntry1] : Reactive airways disease\par CAD\par \par \par She may use inhaled albuterol as needed\par Importance of annual influenza vaccine emphasized\par \par She does not have any signs of sleep apnea\par \par recommend that she continue to monitor pulmonary and respiratory status.\par \par She will return for follow up as needed.\par \par she will  receive influenza vaccine from her primary care physician.  she reports that she has received the pneumonia vaccine.\par \par Bud Green MD [facility]

## 2019-10-07 NOTE — PHYSICAL EXAM
[Elongated Uvula] : elongated uvula [Enlarged Base of the Tongue] : enlargement of the base of the tongue [Jugular Venous Distention Increased] : there was no jugular-venous distention [Heart Rate And Rhythm] : heart rate and rhythm were normal [Heart Sounds] : normal S1 and S2 [Murmurs] : no murmurs present [Arterial Pulses Normal] : the arterial pulses were normal [Respiration, Rhythm And Depth] : normal respiratory rhythm and effort [Exaggerated Use Of Accessory Muscles For Inspiration] : no accessory muscle use [Auscultation Breath Sounds / Voice Sounds] : lungs were clear to auscultation bilaterally [Kyphosis] : kyphosis [Bowel Sounds] : normal bowel sounds [Abdomen Soft] : soft [Abdomen Tenderness] : non-tender [] : no hepato-splenomegaly [Motor Exam] : the motor exam was normal [Nail Clubbing] : no clubbing of the fingernails [Affect] : the affect was normal [Mood] : the mood was normal [Normal Oropharynx] : abnormal oropharynx [Low Lying Soft Palate] : no low lying soft palate [FreeTextEntry1] : protuberant [FreeTextEntry2] : no edema

## 2019-10-07 NOTE — REVIEW OF SYSTEMS
[Hypertension] : ~T hypertension [Heartburn] : heartburn [Reflux] : reflux [Fever] : no fever [Recent Wt Loss (___ Lbs)] : no recent weight loss [Nasal Congestion] : no nasal congestion [Sinus Problems] : no sinus problems [Dysrhythmia] : no dysrhythmia [Edema] : ~T edema was not present [Hay Fever] : no hay fever [Vomiting] : no vomiting [FreeTextEntry2] : no prior history of sinusitis [FreeTextEntry9] : not currently

## 2019-10-07 NOTE — HISTORY OF PRESENT ILLNESS
[FreeTextEntry1] : 87 year old woman who initially presented due to chronic cough as well as dyspnea on exertion.  She has a history of CAD and has had CABG. She has not been a smoker and does not have a history of asthma.  She has had daytime somnolence, but no snoring.  On prior visit, she was prescribed a prednisone course and continued on Symbicort. \par She has remained improved and is not using any inhalers. She is also not requiring montelukast.  She has been evaluated by cardiology regarding HTN, CAD and a PPM.  She states she is breathing well.  Her son is present who confirms that she is doing well.\par \par A CXR was done that reveals mild opacity at the left base that may be due to poor inspiration or body habitus versus a small left pleural effusion or pleural thickening.\par She returns today, much improved, with  only mild cough.  She is accompanied by her son. \par \par She has chronic back pain.\par \par she has been reevaluated by her cardiologist.  She has a PPM.\par \par CXR done on This reveals possible small left pleural effusion, 1 view only.\par \par She has never smoked\par \par \par PSH:\par \par CAB\par Gallbladder\par appendectomy\par Knees\par wrist\par \par ? hysterectomy\par \par PMH\par HTN\par CAD\par hx CABG\par \par \par Allergy\par \par NKDA\par no seasonal allergy.\par \par \par SH never smoked\par \par \par \par ROS back pain, leg fatigue\par arthritis symptoms

## 2019-11-11 ENCOUNTER — APPOINTMENT (OUTPATIENT)
Dept: ELECTROPHYSIOLOGY | Facility: CLINIC | Age: 84
End: 2019-11-11
Payer: MEDICARE

## 2019-11-11 PROCEDURE — 93294 REM INTERROG EVL PM/LDLS PM: CPT

## 2019-11-11 PROCEDURE — 93296 REM INTERROG EVL PM/IDS: CPT

## 2020-02-10 ENCOUNTER — APPOINTMENT (OUTPATIENT)
Dept: ELECTROPHYSIOLOGY | Facility: CLINIC | Age: 85
End: 2020-02-10
Payer: MEDICARE

## 2020-02-10 PROCEDURE — 93280 PM DEVICE PROGR EVAL DUAL: CPT

## 2020-03-02 ENCOUNTER — APPOINTMENT (OUTPATIENT)
Dept: NEUROSURGERY | Facility: CLINIC | Age: 85
End: 2020-03-02
Payer: MEDICARE

## 2020-03-02 VITALS
BODY MASS INDEX: 31.41 KG/M2 | HEIGHT: 60 IN | TEMPERATURE: 98.3 F | RESPIRATION RATE: 13 BRPM | DIASTOLIC BLOOD PRESSURE: 70 MMHG | SYSTOLIC BLOOD PRESSURE: 147 MMHG | OXYGEN SATURATION: 95 % | HEART RATE: 77 BPM | WEIGHT: 160 LBS

## 2020-03-02 DIAGNOSIS — I65.29 OCCLUSION AND STENOSIS OF UNSPECIFIED CAROTID ARTERY: ICD-10-CM

## 2020-03-02 PROCEDURE — 99203 OFFICE O/P NEW LOW 30 MIN: CPT

## 2020-03-02 NOTE — PHYSICAL EXAM
[General Appearance - Alert] : alert [Place] : oriented to place [General Appearance - In No Acute Distress] : in no acute distress [Person] : oriented to person [Motor Strength] : muscle strength was normal in all four extremities [Involuntary Movements] : no involuntary movements were seen [Time] : oriented to time [] : no respiratory distress [Abnormal Walk] : normal gait

## 2020-03-02 NOTE — REASON FOR VISIT
[New Patient Visit] : a new patient visit [Family Member] : family member [Referred By: _________] : Patient was referred by MINOO

## 2020-03-03 LAB — CREAT SERPL-MCNC: 1.36 MG/DL

## 2020-03-03 NOTE — ASSESSMENT
[FreeTextEntry1] : Impression: 87yr old female with left carotid artery stenosis\par \par Plan:\par Carotid Doppler ultrasound from 6/18/2019 showed LICA [id number] and JOCY PSV 84 suggesting left carotid stenosis\par I do not believe the narrowing on the left side is overtly stenotic but want to follow up with CTA head and neck now to evaluate for prior stroke and degree of stenosis\par BUN and Creatinine prior to CTA \par Continue aspirin 81mg daily\par Follow up in our office after the imaging is completed\par Educated patient and son on signs and symptoms of a stroke and should they arise they need to seek medical attention immediately.

## 2020-03-03 NOTE — HISTORY OF PRESENT ILLNESS
[de-identified] : Khushbu Patterson is an 87yr old right handed female here for a new patient visit, referred to us by cardiologist Dr. Good after evaluation with carotid ultrasound showing carotid stenosis.  Hx of HTN, HLD, both of which are stable, COPD/asthma, CAD s/p CABG (2015), and sinus node dysfunction s/p dual chamber PPM placement. She is on aspirin 81mg daily. She denies any history of prior stroke, denies any motor, speech, sensory, visual abnormalities. \par \par \par [employer] RN [id number] Salena Isfrancis  [email or web address]

## 2020-03-09 LAB — BUN SERPL-MCNC: 35 MG/DL

## 2020-05-12 ENCOUNTER — APPOINTMENT (OUTPATIENT)
Dept: ELECTROPHYSIOLOGY | Facility: CLINIC | Age: 85
End: 2020-05-12
Payer: MEDICARE

## 2020-05-12 PROCEDURE — 93294 REM INTERROG EVL PM/LDLS PM: CPT

## 2020-05-12 PROCEDURE — 93296 REM INTERROG EVL PM/IDS: CPT

## 2020-08-14 ENCOUNTER — APPOINTMENT (OUTPATIENT)
Dept: ELECTROPHYSIOLOGY | Facility: CLINIC | Age: 85
End: 2020-08-14
Payer: MEDICARE

## 2020-08-14 PROCEDURE — 93296 REM INTERROG EVL PM/IDS: CPT

## 2020-08-14 PROCEDURE — 93294 REM INTERROG EVL PM/LDLS PM: CPT

## 2020-11-10 ENCOUNTER — APPOINTMENT (OUTPATIENT)
Dept: ELECTROPHYSIOLOGY | Facility: CLINIC | Age: 85
End: 2020-11-10
Payer: MEDICARE

## 2020-11-10 VITALS — SYSTOLIC BLOOD PRESSURE: 160 MMHG | HEART RATE: 61 BPM | DIASTOLIC BLOOD PRESSURE: 65 MMHG

## 2020-11-10 PROCEDURE — 99072 ADDL SUPL MATRL&STAF TM PHE: CPT

## 2020-11-10 PROCEDURE — 93280 PM DEVICE PROGR EVAL DUAL: CPT

## 2021-02-09 ENCOUNTER — APPOINTMENT (OUTPATIENT)
Dept: ELECTROPHYSIOLOGY | Facility: CLINIC | Age: 86
End: 2021-02-09
Payer: MEDICARE

## 2021-02-09 PROCEDURE — 93296 REM INTERROG EVL PM/IDS: CPT

## 2021-02-09 PROCEDURE — 93294 REM INTERROG EVL PM/LDLS PM: CPT

## 2021-02-23 ENCOUNTER — NON-APPOINTMENT (OUTPATIENT)
Age: 86
End: 2021-02-23

## 2021-05-11 ENCOUNTER — NON-APPOINTMENT (OUTPATIENT)
Age: 86
End: 2021-05-11

## 2021-05-11 ENCOUNTER — APPOINTMENT (OUTPATIENT)
Dept: ELECTROPHYSIOLOGY | Facility: CLINIC | Age: 86
End: 2021-05-11
Payer: MEDICARE

## 2021-05-11 PROCEDURE — 93294 REM INTERROG EVL PM/LDLS PM: CPT

## 2021-05-11 PROCEDURE — 93296 REM INTERROG EVL PM/IDS: CPT

## 2021-10-26 NOTE — DISCHARGE NOTE PROVIDER - CARE PROVIDER_API CALL
Patient baseline mental status Salena Benjamin)  Cardiac Electrophysiology; Cardiology; Internal Medicine  [id number] [address] [place] [address]  Phone: [phone]  Fax: [phone]  Follow Up Time:

## 2021-11-12 ENCOUNTER — APPOINTMENT (OUTPATIENT)
Dept: ELECTROPHYSIOLOGY | Facility: CLINIC | Age: 86
End: 2021-11-12

## 2021-11-29 ENCOUNTER — APPOINTMENT (OUTPATIENT)
Dept: ELECTROPHYSIOLOGY | Facility: CLINIC | Age: 86
End: 2021-11-29
Payer: MEDICARE

## 2021-11-29 PROCEDURE — 93280 PM DEVICE PROGR EVAL DUAL: CPT

## 2022-03-01 ENCOUNTER — NON-APPOINTMENT (OUTPATIENT)
Age: 87
End: 2022-03-01

## 2022-03-01 ENCOUNTER — APPOINTMENT (OUTPATIENT)
Dept: ELECTROPHYSIOLOGY | Facility: CLINIC | Age: 87
End: 2022-03-01
Payer: MEDICARE

## 2022-03-01 PROCEDURE — 93296 REM INTERROG EVL PM/IDS: CPT

## 2022-03-01 PROCEDURE — 93294 REM INTERROG EVL PM/LDLS PM: CPT

## 2022-03-06 ENCOUNTER — EMERGENCY (EMERGENCY)
Facility: HOSPITAL | Age: 87
LOS: 1 days | Discharge: HOME | End: 2022-03-06
Attending: EMERGENCY MEDICINE
Payer: MEDICARE

## 2022-03-06 ENCOUNTER — TRANSCRIPTION ENCOUNTER (OUTPATIENT)
Age: 87
End: 2022-03-06

## 2022-03-06 VITALS
OXYGEN SATURATION: 96 % | DIASTOLIC BLOOD PRESSURE: 72 MMHG | SYSTOLIC BLOOD PRESSURE: 155 MMHG | HEART RATE: 84 BPM | WEIGHT: 149.91 LBS | RESPIRATION RATE: 18 BRPM | HEIGHT: 59 IN | TEMPERATURE: 99 F

## 2022-03-06 VITALS
RESPIRATION RATE: 18 BRPM | DIASTOLIC BLOOD PRESSURE: 68 MMHG | TEMPERATURE: 98 F | OXYGEN SATURATION: 96 % | SYSTOLIC BLOOD PRESSURE: 125 MMHG | HEART RATE: 65 BPM

## 2022-03-06 DIAGNOSIS — Z95.1 PRESENCE OF AORTOCORONARY BYPASS GRAFT: Chronic | ICD-10-CM

## 2022-03-06 DIAGNOSIS — Z98.89 OTHER SPECIFIED POSTPROCEDURAL STATES: Chronic | ICD-10-CM

## 2022-03-06 DIAGNOSIS — N81.10 CYSTOCELE, UNSPECIFIED: Chronic | ICD-10-CM

## 2022-03-06 DIAGNOSIS — Z95.0 PRESENCE OF CARDIAC PACEMAKER: Chronic | ICD-10-CM

## 2022-03-06 DIAGNOSIS — D25.9 LEIOMYOMA OF UTERUS, UNSPECIFIED: Chronic | ICD-10-CM

## 2022-03-06 DIAGNOSIS — Z98.49 CATARACT EXTRACTION STATUS, UNSPECIFIED EYE: Chronic | ICD-10-CM

## 2022-03-06 PROCEDURE — 99284 EMERGENCY DEPT VISIT MOD MDM: CPT | Mod: 25

## 2022-03-06 PROCEDURE — 72125 CT NECK SPINE W/O DYE: CPT | Mod: MA

## 2022-03-06 PROCEDURE — 70450 CT HEAD/BRAIN W/O DYE: CPT | Mod: 26,MA

## 2022-03-06 PROCEDURE — 72125 CT NECK SPINE W/O DYE: CPT | Mod: 26,MA

## 2022-03-06 PROCEDURE — 73030 X-RAY EXAM OF SHOULDER: CPT

## 2022-03-06 PROCEDURE — 93005 ELECTROCARDIOGRAM TRACING: CPT

## 2022-03-06 PROCEDURE — 70450 CT HEAD/BRAIN W/O DYE: CPT | Mod: MA

## 2022-03-06 PROCEDURE — 73030 X-RAY EXAM OF SHOULDER: CPT | Mod: 26,RT

## 2022-03-06 PROCEDURE — 99285 EMERGENCY DEPT VISIT HI MDM: CPT

## 2022-03-06 RX ORDER — ACETAMINOPHEN 500 MG
650 TABLET ORAL ONCE
Refills: 0 | Status: COMPLETED | OUTPATIENT
Start: 2022-03-06 | End: 2022-03-06

## 2022-03-06 RX ADMIN — Medication 650 MILLIGRAM(S): at 18:27

## 2022-03-06 RX ADMIN — Medication 650 MILLIGRAM(S): at 18:57

## 2022-03-06 NOTE — ED PROVIDER NOTE - NSFOLLOWUPINSTRUCTIONS_ED_ALL_ED_FT
Esguince cervical    Cervical Sprain      Un esguince cervical es yara distensión o un desgarro de jassi o más de los ligamentos del janice. Los ligamentos son los tejidos que conectan los huesos. Un esguince cervical puede ser desde muy leve a muy grave. En los casos graves, el esguince cervical puede hacer que los huesos de la columna (vértebras) en el janice se vuelvan inestables. Burnett puede osvaldo lugar a daño en la médula jones y problemas graves del sistema nervioso.    El tiempo que demora la curación de un esguince cervical depende de la causa y de la extensión de la lesión. La mayoría de las veces se curan en 4 a 6 semanas.      ¿Cuáles son las causas?    Los esguinces cervicales pueden producirse por un traumatismo elizabeth, por ejemplo, yara lesión recibida en un accidente automovilístico, yara caída o un movimiento brusco de adelante hacia atrás de la bethel y el janice (latigazo cervical). Los esguinces cervicales leves pueden deberse al desgaste que se produce con el paso del tiempo.      ¿Qué incrementa el riesgo?    Los siguientes factores pueden hacer que sea más propenso a contraer esta afección:  •Realizar actividades que conllevan un alto riesgo de sufrir un traumatismo en el janice. Estos incluyen los deportes de contacto, las quinton de automóviles, la gimnasia y el buceo.      •Asumir riesgos al conducir o montar en un vehículo motorizado.      •Artrosis de columna.      •[place] fuerza y flexibilidad en el janice.      •Tener yara lesión previa en el janice.      •Jessie postura.      •Estar yung períodos de tiempo prolongados en ciertas posiciones que estresan el janice, elizabeth sentarse joseph de la computadora yung un thony tiempo.        ¿Cuáles son los signos o síntomas?    Los síntomas de esta afección incluyen:  •Dolor, inflamación, entumecimiento, dolor a la palpación, hinchazón o yara sensación de ardor en el frente, la parte posterior y los laterales del janice, los hombros o la parte superior de la espalda.      •Rigidez repentina de los músculos del janice (espasmos).      •Capacidad limitada para  el janice.      •Dolor de bethel.      •Mareos.      •Náuseas o vómitos.      •Debilidad, adormecimiento u hormigueo en la mano o el brazo.      Los síntomas pueden manifestarse inmediatamente después de la lesión o en el transcurso de algunos días. En algunos casos, los síntomas pueden desaparecer con tratamiento y volver a aparecer (ser recurrentes) más adelante.      ¿Cómo se diagnostica?    Esta afección se puede diagnosticar en función de lo siguiente:  •Nya antecedentes médicos.      •Nya síntomas.      •Cualquier lesión reciente o problemas conocidos en el janice que tenga, elizabeth artritis en el janice.      •Un examen físico.      •Estudios de diagnóstico por imágenes, elizabeth radiografías, resonancia magnética (RM) y exploración por tomografía computarizada (TC).        ¿Cómo se trata?    Esta afección se trata con reposo y la aplicación de hielo en la venancio lesionada, y con ejercicios de fisioterapia. La terapia con calor puede usarse de 2 a 3 días después de la lesión si no hay hinchazón. El tratamiento varía en función de la gravedad de la afección y puede incluir lo siguiente:•Mantener el janice fijo (inmovilizado) yung períodos de tiempo. Burnett se puede hacer mediante lo siguiente:  •Un collarín cervical. Carey sostiene el mentón y la parte posterior del janice.      •Un dispositivo de tracción cervical. Se trata de un cabestrillo que sostiene el janice. Carey dispositivo luther peso y presión del janice, y puede ayudar a aliviar el dolor.        •Medicamentos que ayudan a aliviar el dolor y la inflamación.      •Medicamentos que ayudan relajar los músculos (relajantes musculares).      •Cirugía. Burnett es poco frecuente.        Siga estas instrucciones en schneider casa:      Medicamentos      •Use los medicamentos de venta baldo y los recetados solamente elizabeth se lo haya indicado el médico.    •Pregúntele al médico si el medicamento recetado:  •Hace necesario que evite conducir o usar maquinaria pesada.    •Puede causarle estreñimiento. Es posible que tenga que brianna estas medidas para prevenir o tratar el estreñimiento:  •Beber suficiente líquido elizabeth para mantener la orina de color amarillo pálido.      •Usar medicamentos recetados o de venta baldo.      •Consumir alimentos ricos en fibra, elizabeth frijoles, cereales integrales, y frutas y verduras frescas.      •Limitar el consumo de alimentos ricos en grasa y azúcares procesados, elizabeth los alimentos fritos o dulces.          Si tiene un collarín cervical:     •Use el collarín elizabeth se lo haya indicado el médico. No se lo quite a menos que se lo indiquen.      •Consulte antes de hacerle ajustes al collarín.      •Si tiene el pelo thony, manténgalo fuera del collarín.    •Pregúntele al médico si puede quitarse el collarín para bañarse e higienizarse. En carey bernarda:  •Siga las instrucciones sobre cómo retirarlo de manera noriega.      •Lávelo a mano con agua y jabón suave, y séquelo al aire por completo.      •Si el collarín tiene almohadillas desmontables, quítelas cada 1 o 2 días y lávelas a mano con agua y jabón. Déjelas que se sequen por completo antes de volver a ponerlas en el collarín.        •Informe al médico si tiene irritación o llagas en la piel debajo del collarín.        Control del dolor, la rigidez y la hinchazón                   •Si se lo indican, use un dispositivo de tracción cervical elizabeth se lo hayan explicado.    •Si se lo indican, aplique hielo sobre la venancio afectada. Para hacer esto:  •Ponga el hielo en yara bolsa plástica.      •Coloque yara toalla entre la piel y la bolsa.      •Aplique el hielo yung 20 minutos, 2 a 3 veces por día.      •Si se lo indican, aplique calor en la venancio afectada antes de hacer los ejercicios de fisioterapia o con la frecuencia que le haya indicado el médico. Use la jenny de calor que el médico le recomiende, elizabeth yara compresa de calor húmedo o yara almohadilla térmica.  •Coloque yara toalla entre la piel y la jenny de calor.      •Aplique calor yung 20 a 30 minutos.      •Retire la jenny de calor si la piel se pone de color seay brillante. Burnett es especialmente importante si no puede sentir dolor, calor o frío. Puede correr un riesgo mayor de sufrir quemaduras.        Actividad     • No conduzca mientras usa un collarín cervical. Si no tiene un collarín cervical, pregunte si es seguro que conduzca yung el proceso de curación del janice.      • No levante ningún objeto que pese más de 10 libras (4.5 kg) o que supere el límite de peso que le hayan indicado, hasta que el médico le diga que puede hacerlo.      •Jennifer reposo elizabeth se lo haya indicado el médico.      •Si le indican fisioterapia, jennifer los ejercicios elizabeth se lo haya indicado el médico o el fisioterapeuta.      •Retome nya actividades normales según lo indicado por el médico. Evite las posiciones y actividades que empeoran los síntomas. Pregúntele al médico qué actividades son seguras para usted.      Instrucciones generales     • No consuma ningún producto que contenga nicotina o tabaco, elizabeth cigarrillos, cigarrillos electrónicos y tabaco de mascar. Estos pueden retrasar la recuperación. Si necesita ayuda para dejar de fumar, consulte al médico.      •Concurra a todas las visitas de seguimiento elizabeth se lo haya indicado el médico o fisioterapeuta. Burnett es importante.        ¿Cómo se previene?    Para evitar que se produzca nuevamente un esguince cervical:  •Mantenga yara buena postura. Jennifer los ajustes necesarios en schneider lugar de trabajo para lograr esto.      •Jennifer ejercicio regularmente elizabeth se lo haya indicado el fisioterapeuta o schneider médico.      •Evite realizar actividades de riesgo que puedan causar un esguince cervical.        Comuníquese con un médico si tiene:    •Síntomas que empeoran o no mejoran después de 2 semanas de tratamiento.      •Un dolor que empeora o que no mejora con los medicamentos.      •Síntomas nuevos e inexplicables.      •Llagas o la piel irritada en el janice por el uso del collarín cervical.        Solicite ayuda de inmediato si:    •Siente dolor intenso.      •Siente adormecimiento, hormigueo o debilidad en cualquier parte del cuerpo.      •No puede  yara parte del cuerpo (tiene parálisis).      •Siente dolor en el janice junto con mareos o dolor de bethel intensos.        Resumen    •Un esguince cervical es yara distensión o un desgarro de jassi o más de los ligamentos del janice.      •Los esguinces cervicales pueden producirse por un traumatismo elizabeth, por ejemplo, yara lesión recibida en un accidente automovilístico, yara caída o un movimiento brusco de adelante hacia atrás de la bethel y el janice (latigazo cervical).      •Los síntomas pueden manifestarse inmediatamente después de la lesión o en el transcurso de algunos días.      •Esta afección se puede tratar con reposo, hielo, calor, medicamentos, fisioterapia y cirugía.      Esta información no tiene elizabeth fin reemplazar el consejo del médico. Asegúrese de hacerle al médico cualquier pregunta que tenga.    Cervical Sprain      A cervical sprain is also called a neck sprain. It is a stretch or tear in one or more ligaments in the neck. Ligaments are tissues that connect bones to each other.    Neck sprains can be mild, bad, or very bad. A very bad sprain in the neck can cause the bones in the neck to be unstable. This can damage the spinal cord. It can also cause serious problems in the brain, spinal cord, and nerves (nervous system).    Most neck sprains heal in 4–6 weeks. It can take more or less time depending on:  •What caused the injury.      •The amount of injury.        What are the causes?    Neck sprains may be caused by trauma, such as:  •An injury from an accident in a vehicle such as a car or boat.      •A fall.      •The head and neck being moved front to back or side to side all of a sudden (whiplash injury).      Mild neck sprains may be caused by wear and tear over time.      What increases the risk?    The following factors may make you more likely to develop this condition:•Taking part in activities that put you at high risk of hurting your neck. These include:  •Contact sports.      •Car racing.      •Gymnastics.       •Diving.        •Taking risks when driving or riding in a vehicle such as a car or boat.       •Arthritis caused by wear and tear of the joints in the spine.      •The neck not being very strong or flexible.      •Having had a neck injury in the past.      •Poor posture.      •Spending a lot of time in certain positions that put stress on the neck. This may be from sitting at a computer for a long time.        What are the signs or symptoms?    Symptoms of this condition include:•Your neck, shoulders, or upper back feeling:  •Painful or sore.      •Stiff.      •Tender.      •Swollen.      •Hot, or like it is burning.        •Sudden tightening of neck muscles (spasms).      •Not being able to move the neck very much.      •Headache.      •Feeling dizzy.      •Feeling like you may vomit, or vomiting.    •Having a hand or arm that:  •Feels weak.      •Loses feeling (feels numb).      •Tingles.        You may get symptoms right away after injury, or you may get them over a few days. In some cases, symptoms may go away with treatment and come back over time.      How is this treated?    This condition is treated by:  •Resting your neck.      •Icing the part of your neck that is hurt.      •Doing exercises to restore movement and strength to your neck (physical therapy).      If there is no swelling, you may use heat therapy 2–3 days after the injury took place. If your injury is very bad, treatment may also include:•Keeping your neck in place for a length of time. This may be done using:  •A neck collar. This supports your chin and the back of your head.      •A cervical traction device. This is a sling that holds up your head. The sling removes weight and pressure from your neck. It may also help to relieve pain.      •Medicines that help with:  •Pain.      •Irritation and swelling (inflammation).        •Medicines that help to relax your muscles (muscle relaxants).      •Surgery. This is rare.        Follow these instructions at home:      Medicines      •Take over-the-counter and prescription medicines only as told by your doctor.    •Ask your doctor if the medicine prescribed to you:  •Requires you to avoid driving or using heavy machinery.    •Can cause trouble pooping (constipation). You may need to take these actions to prevent or treat trouble pooping:  •Drink enough fluid to keep your pee (urine) pale yellow.      •Take over-the-counter or prescription medicines.      •Eat foods that are high in fiber. These include beans, whole grains, and fresh fruits and vegetables.      •Limit foods that are high in fat and sugar. These include fried or sweet foods.          If you have a neck collar:     •Wear it as told by your doctor. Do not take it off unless told.      •Ask your doctor before adjusting your collar.      •If you have long hair, keep it outside of the collar.    •Ask your doctor if you may take off the collar for cleaning and bathing. If you may take off the collar:  •Follow instructions about how to take it off safely.      •Clean it by hand with mild soap and water. Let it air-dry fully.    •If your collar has pads that you can take out:  •Take the pads out every 1–2 days.      •Wash them by hand with soap and water.      •Let the pads air-dry fully before you put them back in the collar.        •Tell your doctor if your skin under the collar has irritation or sores.          Managing pain, stiffness, and swelling                   •Use a cervical traction device, if told by your doctor.    •If told, put ice on the affected area. To do this:  •Put ice in a plastic bag.      •Place a towel between your skin and the bag.      •Leave the ice on for 20 minutes, 2–3 times a day.      •If told, put heat on the affected area. Do this before exercise or as often as told by your doctor. Use the heat source that your doctor recommends, such as a moist heat pack or a heating pad.  •Place a towel between your skin and the heat source.      •Leave the heat on for 20–30 minutes.      •Take the heat off if your skin turns bright red. This is very important if you cannot feel pain, heat, or cold. You may have a greater risk of getting burned.        Activity     • Do not drive while wearing a neck collar. If you do not have a neck collar, ask if it is safe to drive while your neck heals.      • Do not lift anything that is heavier than 10 lb (4.5 kg), or the limit that you are told, until your doctor tells you that it is safe.      •Rest as told by your doctor.      •Do exercises as told by your doctor or physical therapist.      •Return to your normal activities as told by your doctor. Avoid positions and activities that make you feel worse. Ask your doctor what activities are safe for you.      General instructions     • Do not use any products that contain nicotine or tobacco, such as cigarettes, e-cigarettes, and chewing tobacco. These can delay healing. If you need help quitting, ask your doctor.      •Keep all follow-up visits as told by your doctor or physical therapist. This is important.        How is this prevented?    To prevent a neck sprain from happening again:  •Practice good posture. Adjust your workstation to help you do this.      •Exercise regularly as told by your doctor or physical therapist.      •Avoid activities that are risky or may cause a neck sprain.        Contact a doctor if:    •Your symptoms get worse.      •Your symptoms do not get better after 2 weeks of treatment.      •Your pain gets worse.      •Medicine does not help your pain.      •You have new symptoms that you cannot explain.      •Your neck collar gives you sores on your skin or bothers your skin.        Get help right away if:    •You have very bad pain.    •You get any of the following in any part of your body:  •Loss of feeling.      •Tingling.      •Weakness.        •You cannot move a part of your body.    •You have neck pain and either of these:  •Very bad dizziness.      •A very bad headache.          Summary    •A cervical sprain is also called a neck sprain. It is a stretch or tear in one or more ligaments in the neck. Ligaments are tissues that connect bones.      •Neck sprains may be caused by trauma, such as an injury or a fall.      •You may get symptoms right away after injury, or you may get them over a few days.      •Neck sprains may be treated with rest, heat, ice, medicines, exercise, and surgery.      This information is not intended to replace advice given to you by your health care provider. Make sure you discuss any questions you have with your health care provider.

## 2022-03-06 NOTE — ED ADULT TRIAGE NOTE - CHIEF COMPLAINT QUOTE
Fall last week Saturday, c/o right shoulder pain since last night. Denies CP, SOB, Pt takes Eliquis.

## 2022-03-06 NOTE — ED ADULT NURSE NOTE - NSIMPLEMENTINTERV_GEN_ALL_ED
Implemented All Fall with Harm Risk Interventions:  Elmer to call system. Call bell, personal items and telephone within reach. Instruct patient to call for assistance. Room bathroom lighting operational. Non-slip footwear when patient is off stretcher. Physically safe environment: no spills, clutter or unnecessary equipment. Stretcher in lowest position, wheels locked, appropriate side rails in place. Provide visual cue, wrist band, yellow gown, etc. Monitor gait and stability. Monitor for mental status changes and reorient to person, place, and time. Review medications for side effects contributing to fall risk. Reinforce activity limits and safety measures with patient and family. Provide visual clues: red socks.

## 2022-03-06 NOTE — ED PROVIDER NOTE - OBJECTIVE STATEMENT
89 y.o female with a PMHx of hypertension and hyperlipidemia and currently taking Eliquis is presenting after trip and fall at home last week. Patient states she had right arm pain that got worse today since the fall and she went to urgent care. Patient states she was transferred by EMS and denies headache, weakness, dizziness, or syncope. 89 y.o female with a PMHx of hypertension and hyperlipidemia and currently taking Eliquis is presenting after fall at home last week. Pt states her rt knee gave way which it has in past due to arthritis, pt is s/p TKR. Patient states she had right arm pain that got worse with movement today since the fall and she went to urgent care. Patient states she was transferred by EMS and denies headache, weakness, dizziness, or syncope.

## 2022-03-06 NOTE — ED PROVIDER NOTE - PATIENT PORTAL LINK FT
You can access the FollowMyHealth Patient Portal offered by [facility] by registering at the following website: [email or web address]. By joining [email or web address]’s FollowMyHealth portal, you will also be able to view your health information using other applications (apps) compatible with our system.

## 2022-03-06 NOTE — ED PROVIDER NOTE - CLINICAL SUMMARY MEDICAL DECISION MAKING FREE TEXT BOX
89 y.o female presenting after a trip and fall with right arm pain. Symptoms may suggest fracture. Will perform x-ray of the shoulder, CT head and spine, and reassess. 89 y.o female presenting after fall 1 week ago with right arm/shoulder/neck pain. Symptoms may suggest fracture, sprain. Normal neuro exam. Will perform x-ray of the shoulder, CT head and spine, and reassess.

## 2022-03-06 NOTE — ED PROVIDER NOTE - PHYSICAL EXAMINATION
Tenderness to palpation to the right proximal humerus and right clavicle.    Right sided paraspinal cervical.

## 2022-06-01 ENCOUNTER — NON-APPOINTMENT (OUTPATIENT)
Age: 87
End: 2022-06-01

## 2022-06-01 ENCOUNTER — APPOINTMENT (OUTPATIENT)
Dept: ELECTROPHYSIOLOGY | Facility: CLINIC | Age: 87
End: 2022-06-01
Payer: MEDICARE

## 2022-06-01 PROCEDURE — 93296 REM INTERROG EVL PM/IDS: CPT

## 2022-06-01 PROCEDURE — 93294 REM INTERROG EVL PM/LDLS PM: CPT

## 2022-08-31 ENCOUNTER — NON-APPOINTMENT (OUTPATIENT)
Age: 87
End: 2022-08-31

## 2022-08-31 ENCOUNTER — APPOINTMENT (OUTPATIENT)
Dept: ELECTROPHYSIOLOGY | Facility: CLINIC | Age: 87
End: 2022-08-31

## 2022-08-31 PROCEDURE — 93294 REM INTERROG EVL PM/LDLS PM: CPT

## 2022-08-31 PROCEDURE — 93296 REM INTERROG EVL PM/IDS: CPT

## 2022-09-01 PROBLEM — I10 ESSENTIAL (PRIMARY) HYPERTENSION: Chronic | Status: ACTIVE | Noted: 2022-03-06

## 2022-09-01 PROBLEM — E78.5 HYPERLIPIDEMIA, UNSPECIFIED: Chronic | Status: ACTIVE | Noted: 2022-03-06

## 2022-11-30 ENCOUNTER — NON-APPOINTMENT (OUTPATIENT)
Age: 87
End: 2022-11-30

## 2022-11-30 ENCOUNTER — APPOINTMENT (OUTPATIENT)
Dept: ELECTROPHYSIOLOGY | Facility: CLINIC | Age: 87
End: 2022-11-30

## 2022-11-30 PROCEDURE — 93294 REM INTERROG EVL PM/LDLS PM: CPT

## 2022-11-30 PROCEDURE — 93296 REM INTERROG EVL PM/IDS: CPT

## 2023-03-01 ENCOUNTER — APPOINTMENT (OUTPATIENT)
Dept: ELECTROPHYSIOLOGY | Facility: CLINIC | Age: 88
End: 2023-03-01
Payer: MEDICARE

## 2023-03-01 ENCOUNTER — NON-APPOINTMENT (OUTPATIENT)
Age: 88
End: 2023-03-01

## 2023-03-01 PROCEDURE — 93296 REM INTERROG EVL PM/IDS: CPT

## 2023-03-01 PROCEDURE — 93294 REM INTERROG EVL PM/LDLS PM: CPT

## 2023-06-02 ENCOUNTER — NON-APPOINTMENT (OUTPATIENT)
Age: 88
End: 2023-06-02

## 2023-06-02 ENCOUNTER — APPOINTMENT (OUTPATIENT)
Dept: ELECTROPHYSIOLOGY | Facility: CLINIC | Age: 88
End: 2023-06-02
Payer: MEDICARE

## 2023-06-02 PROCEDURE — 93294 REM INTERROG EVL PM/LDLS PM: CPT

## 2023-06-02 PROCEDURE — 93296 REM INTERROG EVL PM/IDS: CPT

## 2023-08-29 ENCOUNTER — APPOINTMENT (OUTPATIENT)
Dept: CARE COORDINATION | Facility: HOME HEALTH | Age: 88
End: 2023-08-29

## 2023-09-05 ENCOUNTER — APPOINTMENT (OUTPATIENT)
Dept: ELECTROPHYSIOLOGY | Facility: CLINIC | Age: 88
End: 2023-09-05

## 2023-09-11 ENCOUNTER — APPOINTMENT (OUTPATIENT)
Dept: ELECTROPHYSIOLOGY | Facility: CLINIC | Age: 88
End: 2023-09-11
Payer: MEDICARE

## 2023-09-11 ENCOUNTER — NON-APPOINTMENT (OUTPATIENT)
Age: 88
End: 2023-09-11

## 2023-09-11 VITALS — SYSTOLIC BLOOD PRESSURE: 174 MMHG | HEART RATE: 70 BPM | DIASTOLIC BLOOD PRESSURE: 84 MMHG | RESPIRATION RATE: 14 BRPM

## 2023-09-11 DIAGNOSIS — I49.5 SICK SINUS SYNDROME: ICD-10-CM

## 2023-09-11 PROCEDURE — 93280 PM DEVICE PROGR EVAL DUAL: CPT

## 2023-09-11 RX ORDER — OLMESARTAN MEDOXOMIL-HYDROCHLOROTHIAZIDE 12.5; 4 MG/1; MG/1
40-12.5 TABLET, FILM COATED ORAL DAILY
Refills: 0 | Status: DISCONTINUED | COMMUNITY
End: 2023-09-11

## 2023-09-11 RX ORDER — FOLIC ACID 1 MG/1
1 TABLET ORAL
Refills: 0 | Status: DISCONTINUED | COMMUNITY
End: 2023-09-11

## 2023-09-11 RX ORDER — AMLODIPINE BESYLATE 10 MG/1
10 TABLET ORAL DAILY
Refills: 0 | Status: ACTIVE | COMMUNITY

## 2023-09-11 RX ORDER — APIXABAN 5 MG/1
5 TABLET, FILM COATED ORAL
Refills: 0 | Status: DISCONTINUED | COMMUNITY
End: 2023-09-11

## 2023-09-11 RX ORDER — OLMESARTAN MEDOXOMIL 20 MG/1
20 TABLET, FILM COATED ORAL
Refills: 0 | Status: ACTIVE | COMMUNITY

## 2023-09-11 RX ORDER — EZETIMIBE 10 MG/1
10 TABLET ORAL DAILY
Refills: 0 | Status: DISCONTINUED | COMMUNITY
End: 2023-09-11

## 2023-09-11 RX ORDER — ATORVASTATIN CALCIUM 40 MG/1
40 TABLET, FILM COATED ORAL
Refills: 0 | Status: ACTIVE | COMMUNITY

## 2023-12-18 ENCOUNTER — NON-APPOINTMENT (OUTPATIENT)
Age: 88
End: 2023-12-18

## 2023-12-18 ENCOUNTER — APPOINTMENT (OUTPATIENT)
Dept: ELECTROPHYSIOLOGY | Facility: CLINIC | Age: 88
End: 2023-12-18
Payer: MEDICARE

## 2023-12-18 PROCEDURE — 93294 REM INTERROG EVL PM/LDLS PM: CPT

## 2023-12-18 PROCEDURE — 93296 REM INTERROG EVL PM/IDS: CPT

## 2024-03-18 ENCOUNTER — NON-APPOINTMENT (OUTPATIENT)
Age: 89
End: 2024-03-18

## 2024-03-18 ENCOUNTER — APPOINTMENT (OUTPATIENT)
Dept: ELECTROPHYSIOLOGY | Facility: CLINIC | Age: 89
End: 2024-03-18
Payer: MEDICARE

## 2024-03-18 PROCEDURE — 93294 REM INTERROG EVL PM/LDLS PM: CPT

## 2024-03-18 PROCEDURE — 93296 REM INTERROG EVL PM/IDS: CPT

## 2024-06-17 ENCOUNTER — NON-APPOINTMENT (OUTPATIENT)
Age: 89
End: 2024-06-17

## 2024-06-18 ENCOUNTER — APPOINTMENT (OUTPATIENT)
Dept: ELECTROPHYSIOLOGY | Facility: CLINIC | Age: 89
End: 2024-06-18
Payer: MEDICARE

## 2024-06-18 PROCEDURE — 93296 REM INTERROG EVL PM/IDS: CPT

## 2024-06-18 PROCEDURE — 93294 REM INTERROG EVL PM/LDLS PM: CPT

## 2024-06-19 ENCOUNTER — TRANSCRIPTION ENCOUNTER (OUTPATIENT)
Age: 89
End: 2024-06-19

## 2024-07-05 NOTE — H&P CARDIOLOGY - PULMONARY EMBOLUS
Mail order faxed in request for medication refill. Medication(s) set up as pending orders from medication list.    Mail order request - verified benefits: [employer]        Preferred contact number#  other [phone]        no

## 2024-11-05 ENCOUNTER — APPOINTMENT (OUTPATIENT)
Dept: ELECTROPHYSIOLOGY | Facility: CLINIC | Age: 89
End: 2024-11-05

## 2024-11-18 ENCOUNTER — NON-APPOINTMENT (OUTPATIENT)
Age: 89
End: 2024-11-18

## 2024-11-18 ENCOUNTER — APPOINTMENT (OUTPATIENT)
Dept: ELECTROPHYSIOLOGY | Facility: CLINIC | Age: 89
End: 2024-11-18
Payer: MEDICARE

## 2024-11-18 VITALS — SYSTOLIC BLOOD PRESSURE: 181 MMHG | DIASTOLIC BLOOD PRESSURE: 79 MMHG

## 2024-11-18 VITALS
HEART RATE: 76 BPM | HEIGHT: 60 IN | OXYGEN SATURATION: 97 % | WEIGHT: 165 LBS | BODY MASS INDEX: 32.39 KG/M2 | SYSTOLIC BLOOD PRESSURE: 171 MMHG | DIASTOLIC BLOOD PRESSURE: 70 MMHG

## 2024-11-18 DIAGNOSIS — E78.5 HYPERLIPIDEMIA, UNSPECIFIED: ICD-10-CM

## 2024-11-18 DIAGNOSIS — I10 ESSENTIAL (PRIMARY) HYPERTENSION: ICD-10-CM

## 2024-11-18 DIAGNOSIS — Z95.0 PRESENCE OF CARDIAC PACEMAKER: ICD-10-CM

## 2024-11-18 DIAGNOSIS — I49.5 SICK SINUS SYNDROME: ICD-10-CM

## 2024-11-18 PROCEDURE — 93000 ELECTROCARDIOGRAM COMPLETE: CPT

## 2024-11-18 PROCEDURE — 99214 OFFICE O/P EST MOD 30 MIN: CPT | Mod: 25

## 2024-12-16 ENCOUNTER — NON-APPOINTMENT (OUTPATIENT)
Age: 88
End: 2024-12-16

## 2024-12-16 ENCOUNTER — APPOINTMENT (OUTPATIENT)
Dept: ELECTROPHYSIOLOGY | Facility: CLINIC | Age: 88
End: 2024-12-16
Payer: MEDICARE

## 2024-12-16 PROCEDURE — 93294 REM INTERROG EVL PM/LDLS PM: CPT

## 2024-12-16 PROCEDURE — 93296 REM INTERROG EVL PM/IDS: CPT

## 2025-01-30 NOTE — CONSULT LETTER
Encounter addended by: Renard Forte M.D. on: 1/29/2025 4:11 PM   Actions taken: Clinical Note Signed, Level of Service modified [facility]  Bridger Cifuentes M.D.  [address] [place] [address]  Fax: [phone]   Authorization for Release/Disclosure of   Protected Health Information   Name: HARIS JEWELL : 1996 SSN: [id number]   Address: [facility] [address] [place] [address] Phone:    [phone] (home)    I authorize the entity listed below to release/disclose the PHI below to:   [facility]/Bridger Cifuentes M.D. and Bridger Cifuentes M.D.   Provider or Entity Name:     Address   City, State, Zip   Phone:      Fax:     Reason for request: continuity of care   Information to be released:    [  ] LAST COLONOSCOPY,  including any PATH REPORT and follow-up  [  ] LAST FIT/COLOGUARD RESULT [  ] LAST DEXA  [  ] LAST MAMMOGRAM  [  ] LAST PAP  [  ] LAST LABS [  ] RETINA EXAM REPORT  [  ] IMMUNIZATION RECORDS  [  ] Release all info      [  ] Check here and initial the line next to each item to release ALL health information INCLUDING  _____ Care and treatment for drug and / or alcohol abuse  _____ HIV testing, infection status, or AIDS  _____ Genetic Testing    DATES OF SERVICE OR TIME PERIOD TO BE DISCLOSED: _____________  I understand and acknowledge that:  * This Authorization may be revoked at any time by you in writing, except if your health information has already been used or disclosed.  * Your health information that will be used or disclosed as a result of you signing this authorization could be re-disclosed by the recipient. If this occurs, your re-disclosed health information may no longer be protected by State or Federal laws.  * You may refuse to sign this Authorization. Your refusal will not affect your ability to obtain treatment.  * This Authorization becomes effective upon signing and will  on (date) __________.      If no date is indicated, this Authorization will  one (1) year from the signature date.    Name: Haris Jewell    Signature:   Date:     2019       PLEASE FAX REQUESTED RECORDS  BACK TO: [phone]   [Consult Letter:] : I had the pleasure of evaluating your patient, [unfilled]. [Dear  ___] : Dear  [unfilled], [Consult Closing:] : Thank you very much for allowing me to participate in the care of this patient.  If you have any questions, please do not hesitate to contact me. [FreeTextEntry1] : As you know she is an 87yr old right handed female with a past medical history of of HTN, HLD, COPD/asthma, CAD s/p CABG (2015), and sinus node dysfunction s/p dual chamber PPM placement.  She underwent evaluatio with caoritd doppler ultrasound which suggested left internal carotid artery stenosis.  She is on aspirin 81mg daily. She denies any history of prior stroke, denies any motor, speech, sensory, visual abnormalities. The pacemaker is not MRI compatible. I do not believe the narrowing on the left side is overtly stenotic but want to follow up with CTA head and neck now to evaluate for prior stroke and degree of stenosis.  She will follow up in our office after this new imaging is completed. \par \par  [FreeTextEntry3] : \par Sincerely,\par \par Shakir Lan MD\par Professor of Neurological Surgery and Radiology\par  Department of Neurosurgery\par Director Cerebrovascular Surgery\par [facility] [employer] at [facility]\par

## 2025-03-18 ENCOUNTER — NON-APPOINTMENT (OUTPATIENT)
Age: 89
End: 2025-03-18

## 2025-03-18 ENCOUNTER — APPOINTMENT (OUTPATIENT)
Dept: ELECTROPHYSIOLOGY | Facility: CLINIC | Age: 89
End: 2025-03-18
Payer: MEDICARE

## 2025-03-18 PROCEDURE — 93296 REM INTERROG EVL PM/IDS: CPT

## 2025-03-18 PROCEDURE — 93294 REM INTERROG EVL PM/LDLS PM: CPT

## 2025-06-17 ENCOUNTER — APPOINTMENT (OUTPATIENT)
Dept: ELECTROPHYSIOLOGY | Facility: CLINIC | Age: 89
End: 2025-06-17
Payer: MEDICARE

## 2025-06-17 ENCOUNTER — NON-APPOINTMENT (OUTPATIENT)
Age: 89
End: 2025-06-17

## 2025-06-17 PROCEDURE — 93296 REM INTERROG EVL PM/IDS: CPT

## 2025-06-17 PROCEDURE — 93294 REM INTERROG EVL PM/LDLS PM: CPT

## 2025-09-18 ENCOUNTER — APPOINTMENT (OUTPATIENT)
Dept: ELECTROPHYSIOLOGY | Facility: CLINIC | Age: 89
End: 2025-09-18
Payer: MEDICARE

## 2025-09-18 ENCOUNTER — NON-APPOINTMENT (OUTPATIENT)
Age: 89
End: 2025-09-18

## 2025-09-18 PROCEDURE — 93296 REM INTERROG EVL PM/IDS: CPT

## 2025-09-18 PROCEDURE — 93294 REM INTERROG EVL PM/LDLS PM: CPT
